# Patient Record
Sex: FEMALE | Race: WHITE | HISPANIC OR LATINO | Employment: UNEMPLOYED | URBAN - METROPOLITAN AREA
[De-identification: names, ages, dates, MRNs, and addresses within clinical notes are randomized per-mention and may not be internally consistent; named-entity substitution may affect disease eponyms.]

---

## 2018-08-01 ENCOUNTER — HOSPITAL ENCOUNTER (INPATIENT)
Facility: MEDICAL CENTER | Age: 58
LOS: 1 days | DRG: 282 | End: 2018-08-02
Attending: EMERGENCY MEDICINE | Admitting: HOSPITALIST
Payer: COMMERCIAL

## 2018-08-01 DIAGNOSIS — I24.9 ACUTE CORONARY SYNDROME (HCC): ICD-10-CM

## 2018-08-01 PROBLEM — I10 HTN (HYPERTENSION): Status: ACTIVE | Noted: 2018-08-01

## 2018-08-01 PROBLEM — R79.9 ELEVATED BUN: Status: ACTIVE | Noted: 2018-08-01

## 2018-08-01 PROBLEM — E11.9 DM (DIABETES MELLITUS) (HCC): Status: ACTIVE | Noted: 2018-08-01

## 2018-08-01 PROBLEM — E78.5 HLD (HYPERLIPIDEMIA): Status: ACTIVE | Noted: 2018-08-01

## 2018-08-01 PROBLEM — I21.4 NSTEMI (NON-ST ELEVATED MYOCARDIAL INFARCTION) (HCC): Status: ACTIVE | Noted: 2018-08-01

## 2018-08-01 PROBLEM — R07.9 PAIN IN THE CHEST: Status: ACTIVE | Noted: 2018-08-01

## 2018-08-01 PROBLEM — E87.6 HYPOKALEMIA: Status: ACTIVE | Noted: 2018-08-01

## 2018-08-01 LAB
ALBUMIN SERPL BCP-MCNC: 4.2 G/DL (ref 3.2–4.9)
ALBUMIN/GLOB SERPL: 1.6 G/DL
ALP SERPL-CCNC: 82 U/L (ref 30–99)
ALT SERPL-CCNC: 46 U/L (ref 2–50)
ANION GAP SERPL CALC-SCNC: 8 MMOL/L (ref 0–11.9)
APTT PPP: 27 SEC (ref 24.7–36)
APTT PPP: 27.1 SEC (ref 24.7–36)
AST SERPL-CCNC: 66 U/L (ref 12–45)
BASOPHILS # BLD AUTO: 0.3 % (ref 0–1.8)
BASOPHILS # BLD: 0.03 K/UL (ref 0–0.12)
BILIRUB SERPL-MCNC: 0.5 MG/DL (ref 0.1–1.5)
BUN SERPL-MCNC: 16 MG/DL (ref 8–22)
CALCIUM SERPL-MCNC: 9 MG/DL (ref 8.5–10.5)
CHLORIDE SERPL-SCNC: 109 MMOL/L (ref 96–112)
CHOLEST SERPL-MCNC: 97 MG/DL (ref 100–199)
CO2 SERPL-SCNC: 22 MMOL/L (ref 20–33)
CREAT SERPL-MCNC: 0.64 MG/DL (ref 0.5–1.4)
EKG IMPRESSION: NORMAL
EKG IMPRESSION: NORMAL
EOSINOPHIL # BLD AUTO: 0.03 K/UL (ref 0–0.51)
EOSINOPHIL NFR BLD: 0.3 % (ref 0–6.9)
ERYTHROCYTE [DISTWIDTH] IN BLOOD BY AUTOMATED COUNT: 43.1 FL (ref 35.9–50)
EST. AVERAGE GLUCOSE BLD GHB EST-MCNC: 128 MG/DL
EST. AVERAGE GLUCOSE BLD GHB EST-MCNC: 128 MG/DL
GLOBULIN SER CALC-MCNC: 2.7 G/DL (ref 1.9–3.5)
GLUCOSE BLD-MCNC: 107 MG/DL (ref 65–99)
GLUCOSE BLD-MCNC: 138 MG/DL (ref 65–99)
GLUCOSE BLD-MCNC: 239 MG/DL (ref 65–99)
GLUCOSE SERPL-MCNC: 229 MG/DL (ref 65–99)
HBA1C MFR BLD: 6.1 % (ref 0–5.6)
HBA1C MFR BLD: 6.1 % (ref 0–5.6)
HCT VFR BLD AUTO: 37.7 % (ref 37–47)
HDLC SERPL-MCNC: 25 MG/DL
HGB BLD-MCNC: 12.6 G/DL (ref 12–16)
IMM GRANULOCYTES # BLD AUTO: 0.05 K/UL (ref 0–0.11)
IMM GRANULOCYTES NFR BLD AUTO: 0.5 % (ref 0–0.9)
LDLC SERPL CALC-MCNC: 30 MG/DL
LYMPHOCYTES # BLD AUTO: 1.59 K/UL (ref 1–4.8)
LYMPHOCYTES NFR BLD: 16.7 % (ref 22–41)
MCH RBC QN AUTO: 30.5 PG (ref 27–33)
MCHC RBC AUTO-ENTMCNC: 33.4 G/DL (ref 33.6–35)
MCV RBC AUTO: 91.3 FL (ref 81.4–97.8)
MONOCYTES # BLD AUTO: 0.48 K/UL (ref 0–0.85)
MONOCYTES NFR BLD AUTO: 5.1 % (ref 0–13.4)
NEUTROPHILS # BLD AUTO: 7.32 K/UL (ref 2–7.15)
NEUTROPHILS NFR BLD: 77.1 % (ref 44–72)
NRBC # BLD AUTO: 0 K/UL
NRBC BLD-RTO: 0 /100 WBC
PLATELET # BLD AUTO: 209 K/UL (ref 164–446)
PMV BLD AUTO: 10.5 FL (ref 9–12.9)
POTASSIUM SERPL-SCNC: 3.8 MMOL/L (ref 3.6–5.5)
PROT SERPL-MCNC: 6.9 G/DL (ref 6–8.2)
RBC # BLD AUTO: 4.13 M/UL (ref 4.2–5.4)
SODIUM SERPL-SCNC: 139 MMOL/L (ref 135–145)
TRIGL SERPL-MCNC: 209 MG/DL (ref 0–149)
TROPONIN I SERPL-MCNC: 1.24 NG/ML (ref 0–0.04)
TROPONIN I SERPL-MCNC: 16.28 NG/ML (ref 0–0.04)
TSH SERPL DL<=0.005 MIU/L-ACNC: 1.3 UIU/ML (ref 0.38–5.33)
WBC # BLD AUTO: 9.5 K/UL (ref 4.8–10.8)

## 2018-08-01 PROCEDURE — 304952 HCHG R 2 PADS

## 2018-08-01 PROCEDURE — 80061 LIPID PANEL: CPT

## 2018-08-01 PROCEDURE — 93005 ELECTROCARDIOGRAM TRACING: CPT | Performed by: HOSPITALIST

## 2018-08-01 PROCEDURE — 96374 THER/PROPH/DIAG INJ IV PUSH: CPT

## 2018-08-01 PROCEDURE — 93005 ELECTROCARDIOGRAM TRACING: CPT | Performed by: EMERGENCY MEDICINE

## 2018-08-01 PROCEDURE — B2151ZZ FLUOROSCOPY OF LEFT HEART USING LOW OSMOLAR CONTRAST: ICD-10-PCS | Performed by: INTERNAL MEDICINE

## 2018-08-01 PROCEDURE — 700117 HCHG RX CONTRAST REV CODE 255: Performed by: INTERNAL MEDICINE

## 2018-08-01 PROCEDURE — 96375 TX/PRO/DX INJ NEW DRUG ADDON: CPT

## 2018-08-01 PROCEDURE — 93010 ELECTROCARDIOGRAM REPORT: CPT | Performed by: INTERNAL MEDICINE

## 2018-08-01 PROCEDURE — 85730 THROMBOPLASTIN TIME PARTIAL: CPT

## 2018-08-01 PROCEDURE — 99285 EMERGENCY DEPT VISIT HI MDM: CPT

## 2018-08-01 PROCEDURE — 83036 HEMOGLOBIN GLYCOSYLATED A1C: CPT

## 2018-08-01 PROCEDURE — 94760 N-INVAS EAR/PLS OXIMETRY 1: CPT

## 2018-08-01 PROCEDURE — 93458 L HRT ARTERY/VENTRICLE ANGIO: CPT

## 2018-08-01 PROCEDURE — 700102 HCHG RX REV CODE 250 W/ 637 OVERRIDE(OP): Performed by: INTERNAL MEDICINE

## 2018-08-01 PROCEDURE — 4A023N7 MEASUREMENT OF CARDIAC SAMPLING AND PRESSURE, LEFT HEART, PERCUTANEOUS APPROACH: ICD-10-PCS | Performed by: INTERNAL MEDICINE

## 2018-08-01 PROCEDURE — 82962 GLUCOSE BLOOD TEST: CPT

## 2018-08-01 PROCEDURE — 700105 HCHG RX REV CODE 258: Performed by: HOSPITALIST

## 2018-08-01 PROCEDURE — 700102 HCHG RX REV CODE 250 W/ 637 OVERRIDE(OP): Performed by: HOSPITALIST

## 2018-08-01 PROCEDURE — 700111 HCHG RX REV CODE 636 W/ 250 OVERRIDE (IP): Performed by: HOSPITALIST

## 2018-08-01 PROCEDURE — C1769 GUIDE WIRE: HCPCS

## 2018-08-01 PROCEDURE — 770020 HCHG ROOM/CARE - TELE (206)

## 2018-08-01 PROCEDURE — C1894 INTRO/SHEATH, NON-LASER: HCPCS

## 2018-08-01 PROCEDURE — 700105 HCHG RX REV CODE 258: Performed by: INTERNAL MEDICINE

## 2018-08-01 PROCEDURE — 36415 COLL VENOUS BLD VENIPUNCTURE: CPT

## 2018-08-01 PROCEDURE — 80053 COMPREHEN METABOLIC PANEL: CPT

## 2018-08-01 PROCEDURE — 84484 ASSAY OF TROPONIN QUANT: CPT

## 2018-08-01 PROCEDURE — 85025 COMPLETE CBC W/AUTO DIFF WBC: CPT

## 2018-08-01 PROCEDURE — 99223 1ST HOSP IP/OBS HIGH 75: CPT | Performed by: HOSPITALIST

## 2018-08-01 PROCEDURE — A9270 NON-COVERED ITEM OR SERVICE: HCPCS | Performed by: HOSPITALIST

## 2018-08-01 PROCEDURE — 93005 ELECTROCARDIOGRAM TRACING: CPT

## 2018-08-01 PROCEDURE — 700111 HCHG RX REV CODE 636 W/ 250 OVERRIDE (IP): Performed by: EMERGENCY MEDICINE

## 2018-08-01 PROCEDURE — 700111 HCHG RX REV CODE 636 W/ 250 OVERRIDE (IP)

## 2018-08-01 PROCEDURE — 76937 US GUIDE VASCULAR ACCESS: CPT

## 2018-08-01 PROCEDURE — 307093 HCHG TR BAND RADIAL

## 2018-08-01 PROCEDURE — C1887 CATHETER, GUIDING: HCPCS

## 2018-08-01 PROCEDURE — B2111ZZ FLUOROSCOPY OF MULTIPLE CORONARY ARTERIES USING LOW OSMOLAR CONTRAST: ICD-10-PCS | Performed by: INTERNAL MEDICINE

## 2018-08-01 PROCEDURE — A9270 NON-COVERED ITEM OR SERVICE: HCPCS | Performed by: INTERNAL MEDICINE

## 2018-08-01 PROCEDURE — 700101 HCHG RX REV CODE 250

## 2018-08-01 PROCEDURE — 84443 ASSAY THYROID STIM HORMONE: CPT

## 2018-08-01 PROCEDURE — 99152 MOD SED SAME PHYS/QHP 5/>YRS: CPT

## 2018-08-01 RX ORDER — SODIUM CHLORIDE 9 MG/ML
INJECTION, SOLUTION INTRAVENOUS CONTINUOUS
Status: DISCONTINUED | OUTPATIENT
Start: 2018-08-01 | End: 2018-08-01

## 2018-08-01 RX ORDER — VERAPAMIL HYDROCHLORIDE 2.5 MG/ML
INJECTION, SOLUTION INTRAVENOUS
Status: COMPLETED
Start: 2018-08-01 | End: 2018-08-01

## 2018-08-01 RX ORDER — AMOXICILLIN 250 MG
2 CAPSULE ORAL 2 TIMES DAILY
Status: DISCONTINUED | OUTPATIENT
Start: 2018-08-01 | End: 2018-08-02 | Stop reason: HOSPADM

## 2018-08-01 RX ORDER — ASPIRIN 300 MG/1
300 SUPPOSITORY RECTAL DAILY
Status: DISCONTINUED | OUTPATIENT
Start: 2018-08-01 | End: 2018-08-01

## 2018-08-01 RX ORDER — ASPIRIN 81 MG/1
324 TABLET, CHEWABLE ORAL DAILY
Status: DISCONTINUED | OUTPATIENT
Start: 2018-08-01 | End: 2018-08-01

## 2018-08-01 RX ORDER — HEPARIN SODIUM,PORCINE 1000/ML
VIAL (ML) INJECTION
Status: COMPLETED
Start: 2018-08-01 | End: 2018-08-01

## 2018-08-01 RX ORDER — ATORVASTATIN CALCIUM 80 MG/1
80 TABLET, FILM COATED ORAL DAILY
Status: DISCONTINUED | OUTPATIENT
Start: 2018-08-01 | End: 2018-08-02 | Stop reason: HOSPADM

## 2018-08-01 RX ORDER — MIDAZOLAM HYDROCHLORIDE 1 MG/ML
INJECTION INTRAMUSCULAR; INTRAVENOUS
Status: COMPLETED
Start: 2018-08-01 | End: 2018-08-01

## 2018-08-01 RX ORDER — HEPARIN SODIUM 5000 [USP'U]/ML
5000 INJECTION, SOLUTION INTRAVENOUS; SUBCUTANEOUS EVERY 8 HOURS
Status: DISCONTINUED | OUTPATIENT
Start: 2018-08-01 | End: 2018-08-01

## 2018-08-01 RX ORDER — OXYCODONE HYDROCHLORIDE 5 MG/1
5 TABLET ORAL
Status: DISCONTINUED | OUTPATIENT
Start: 2018-08-01 | End: 2018-08-02 | Stop reason: HOSPADM

## 2018-08-01 RX ORDER — ASPIRIN 325 MG
325 TABLET ORAL DAILY
Status: DISCONTINUED | OUTPATIENT
Start: 2018-08-01 | End: 2018-08-02 | Stop reason: HOSPADM

## 2018-08-01 RX ORDER — ONDANSETRON 2 MG/ML
4 INJECTION INTRAMUSCULAR; INTRAVENOUS EVERY 4 HOURS PRN
Status: DISCONTINUED | OUTPATIENT
Start: 2018-08-01 | End: 2018-08-02 | Stop reason: HOSPADM

## 2018-08-01 RX ORDER — HEPARIN SODIUM 1000 [USP'U]/ML
5000 INJECTION, SOLUTION INTRAVENOUS; SUBCUTANEOUS ONCE
Status: COMPLETED | OUTPATIENT
Start: 2018-08-01 | End: 2018-08-01

## 2018-08-01 RX ORDER — LIDOCAINE HYDROCHLORIDE 20 MG/ML
INJECTION, SOLUTION INFILTRATION; PERINEURAL
Status: COMPLETED
Start: 2018-08-01 | End: 2018-08-01

## 2018-08-01 RX ORDER — BISACODYL 10 MG
10 SUPPOSITORY, RECTAL RECTAL
Status: DISCONTINUED | OUTPATIENT
Start: 2018-08-01 | End: 2018-08-02 | Stop reason: HOSPADM

## 2018-08-01 RX ORDER — ENALAPRIL MALEATE 2.5 MG/1
2.5 TABLET ORAL TWICE DAILY
Status: DISCONTINUED | OUTPATIENT
Start: 2018-08-01 | End: 2018-08-02 | Stop reason: HOSPADM

## 2018-08-01 RX ORDER — ASPIRIN 325 MG
325 TABLET ORAL DAILY
Status: DISCONTINUED | OUTPATIENT
Start: 2018-08-01 | End: 2018-08-01

## 2018-08-01 RX ORDER — PROMETHAZINE HYDROCHLORIDE 25 MG/1
12.5-25 SUPPOSITORY RECTAL EVERY 4 HOURS PRN
Status: DISCONTINUED | OUTPATIENT
Start: 2018-08-01 | End: 2018-08-02 | Stop reason: HOSPADM

## 2018-08-01 RX ORDER — PROMETHAZINE HYDROCHLORIDE 25 MG/1
12.5-25 TABLET ORAL EVERY 4 HOURS PRN
Status: DISCONTINUED | OUTPATIENT
Start: 2018-08-01 | End: 2018-08-02 | Stop reason: HOSPADM

## 2018-08-01 RX ORDER — POTASSIUM CHLORIDE 20 MEQ/1
40 TABLET, EXTENDED RELEASE ORAL ONCE
Status: COMPLETED | OUTPATIENT
Start: 2018-08-01 | End: 2018-08-01

## 2018-08-01 RX ORDER — MORPHINE SULFATE 4 MG/ML
4 INJECTION, SOLUTION INTRAMUSCULAR; INTRAVENOUS ONCE
Status: COMPLETED | OUTPATIENT
Start: 2018-08-01 | End: 2018-08-01

## 2018-08-01 RX ORDER — OXYCODONE HYDROCHLORIDE 10 MG/1
10 TABLET ORAL
Status: DISCONTINUED | OUTPATIENT
Start: 2018-08-01 | End: 2018-08-02 | Stop reason: HOSPADM

## 2018-08-01 RX ORDER — HEPARIN SODIUM 1000 [USP'U]/ML
2600 INJECTION, SOLUTION INTRAVENOUS; SUBCUTANEOUS PRN
Status: DISCONTINUED | OUTPATIENT
Start: 2018-08-01 | End: 2018-08-02

## 2018-08-01 RX ORDER — DEXTROSE MONOHYDRATE 25 G/50ML
25 INJECTION, SOLUTION INTRAVENOUS
Status: DISCONTINUED | OUTPATIENT
Start: 2018-08-01 | End: 2018-08-02 | Stop reason: HOSPADM

## 2018-08-01 RX ORDER — ONDANSETRON 2 MG/ML
4 INJECTION INTRAMUSCULAR; INTRAVENOUS ONCE
Status: COMPLETED | OUTPATIENT
Start: 2018-08-01 | End: 2018-08-01

## 2018-08-01 RX ORDER — SODIUM CHLORIDE 9 MG/ML
INJECTION, SOLUTION INTRAVENOUS CONTINUOUS
Status: DISCONTINUED | OUTPATIENT
Start: 2018-08-01 | End: 2018-08-02 | Stop reason: HOSPADM

## 2018-08-01 RX ORDER — POLYETHYLENE GLYCOL 3350 17 G/17G
1 POWDER, FOR SOLUTION ORAL
Status: DISCONTINUED | OUTPATIENT
Start: 2018-08-01 | End: 2018-08-02 | Stop reason: HOSPADM

## 2018-08-01 RX ORDER — MORPHINE SULFATE 4 MG/ML
2-4 INJECTION, SOLUTION INTRAMUSCULAR; INTRAVENOUS
Status: DISCONTINUED | OUTPATIENT
Start: 2018-08-01 | End: 2018-08-02 | Stop reason: HOSPADM

## 2018-08-01 RX ORDER — ONDANSETRON 4 MG/1
4 TABLET, ORALLY DISINTEGRATING ORAL EVERY 4 HOURS PRN
Status: DISCONTINUED | OUTPATIENT
Start: 2018-08-01 | End: 2018-08-02 | Stop reason: HOSPADM

## 2018-08-01 RX ADMIN — ASPIRIN 325 MG: 325 TABLET ORAL at 06:14

## 2018-08-01 RX ADMIN — ENALAPRIL MALEATE 2.5 MG: 2.5 TABLET ORAL at 17:45

## 2018-08-01 RX ADMIN — SODIUM CHLORIDE: 9 INJECTION, SOLUTION INTRAVENOUS at 05:30

## 2018-08-01 RX ADMIN — DOCUSATE SODIUM AND SENNOSIDES 2 TABLET: 8.6; 5 TABLET, FILM COATED ORAL at 17:47

## 2018-08-01 RX ADMIN — METOPROLOL TARTRATE 25 MG: 25 TABLET, FILM COATED ORAL at 17:46

## 2018-08-01 RX ADMIN — ATORVASTATIN CALCIUM 80 MG: 80 TABLET, FILM COATED ORAL at 06:14

## 2018-08-01 RX ADMIN — NITROGLYCERIN 10 ML: 20 INJECTION INTRAVENOUS at 07:48

## 2018-08-01 RX ADMIN — HEPARIN SODIUM 2000 UNITS: 200 INJECTION, SOLUTION INTRAVENOUS at 07:48

## 2018-08-01 RX ADMIN — SODIUM CHLORIDE: 9 INJECTION, SOLUTION INTRAVENOUS at 17:49

## 2018-08-01 RX ADMIN — LIDOCAINE HYDROCHLORIDE: 20 INJECTION, SOLUTION INFILTRATION; PERINEURAL at 07:48

## 2018-08-01 RX ADMIN — NITROGLYCERIN 0.5 INCH: 20 OINTMENT TOPICAL at 17:48

## 2018-08-01 RX ADMIN — HEPARIN SODIUM: 1000 INJECTION, SOLUTION INTRAVENOUS; SUBCUTANEOUS at 07:47

## 2018-08-01 RX ADMIN — FENTANYL CITRATE 50 MCG: 50 INJECTION, SOLUTION INTRAMUSCULAR; INTRAVENOUS at 08:36

## 2018-08-01 RX ADMIN — ONDANSETRON HYDROCHLORIDE 4 MG: 2 INJECTION, SOLUTION INTRAMUSCULAR; INTRAVENOUS at 04:06

## 2018-08-01 RX ADMIN — VERAPAMIL HYDROCHLORIDE 5 MG: 2.5 INJECTION, SOLUTION INTRAVENOUS at 07:48

## 2018-08-01 RX ADMIN — HEPARIN SODIUM 25000 UNITS: 5000 INJECTION, SOLUTION INTRAVENOUS at 05:18

## 2018-08-01 RX ADMIN — MIDAZOLAM HYDROCHLORIDE 2 MG: 1 INJECTION, SOLUTION INTRAMUSCULAR; INTRAVENOUS at 08:36

## 2018-08-01 RX ADMIN — MORPHINE SULFATE 4 MG: 4 INJECTION INTRAVENOUS at 04:06

## 2018-08-01 RX ADMIN — NITROGLYCERIN 0.5 INCH: 20 OINTMENT TOPICAL at 13:21

## 2018-08-01 RX ADMIN — HEPARIN SODIUM 5000 UNITS: 1000 INJECTION, SOLUTION INTRAVENOUS; SUBCUTANEOUS at 05:18

## 2018-08-01 RX ADMIN — IOHEXOL 57 ML: 350 INJECTION, SOLUTION INTRAVENOUS at 08:37

## 2018-08-01 RX ADMIN — POTASSIUM CHLORIDE 40 MEQ: 1500 TABLET, EXTENDED RELEASE ORAL at 06:09

## 2018-08-01 RX ADMIN — INSULIN HUMAN 2 UNITS: 100 INJECTION, SOLUTION PARENTERAL at 12:48

## 2018-08-01 ASSESSMENT — PATIENT HEALTH QUESTIONNAIRE - PHQ9
SUM OF ALL RESPONSES TO PHQ9 QUESTIONS 1 AND 2: 1
9. THOUGHTS THAT YOU WOULD BE BETTER OFF DEAD, OR OF HURTING YOURSELF: NOT AT ALL
1. LITTLE INTEREST OR PLEASURE IN DOING THINGS: NOT AT ALL
SUM OF ALL RESPONSES TO PHQ QUESTIONS 1-9: 5
8. MOVING OR SPEAKING SO SLOWLY THAT OTHER PEOPLE COULD HAVE NOTICED. OR THE OPPOSITE, BEING SO FIGETY OR RESTLESS THAT YOU HAVE BEEN MOVING AROUND A LOT MORE THAN USUAL: NOT AT ALL
2. FEELING DOWN, DEPRESSED, IRRITABLE, OR HOPELESS: SEVERAL DAYS
4. FEELING TIRED OR HAVING LITTLE ENERGY: SEVERAL DAYS
6. FEELING BAD ABOUT YOURSELF - OR THAT YOU ARE A FAILURE OR HAVE LET YOURSELF OR YOUR FAMILY DOWN: NOT AL ALL
5. POOR APPETITE OR OVEREATING: NOT AT ALL
7. TROUBLE CONCENTRATING ON THINGS, SUCH AS READING THE NEWSPAPER OR WATCHING TELEVISION: NOT AT ALL
3. TROUBLE FALLING OR STAYING ASLEEP OR SLEEPING TOO MUCH: NEARLY EVERY DAY

## 2018-08-01 ASSESSMENT — COGNITIVE AND FUNCTIONAL STATUS - GENERAL
DAILY ACTIVITIY SCORE: 24
MOVING TO AND FROM BED TO CHAIR: A LITTLE
SUGGESTED CMS G CODE MODIFIER MOBILITY: CJ
SUGGESTED CMS G CODE MODIFIER DAILY ACTIVITY: CH
WALKING IN HOSPITAL ROOM: A LITTLE
MOBILITY SCORE: 22
SUGGESTED CMS G CODE MODIFIER MOBILITY: CH
MOBILITY SCORE: 24

## 2018-08-01 ASSESSMENT — LIFESTYLE VARIABLES
DO YOU DRINK ALCOHOL: NO
SUBSTANCE_ABUSE: 0

## 2018-08-01 ASSESSMENT — ENCOUNTER SYMPTOMS
FOCAL WEAKNESS: 0
PALPITATIONS: 0
BLURRED VISION: 0
HEARTBURN: 0
DEPRESSION: 0
DOUBLE VISION: 0
EYE DISCHARGE: 0
DIZZINESS: 0
SHORTNESS OF BREATH: 1
WEAKNESS: 0
FEVER: 0
ABDOMINAL PAIN: 0
FLANK PAIN: 0
NAUSEA: 1
HEMOPTYSIS: 0
VOMITING: 0
MYALGIAS: 0
DIAPHORESIS: 1
CHILLS: 0
SENSORY CHANGE: 0
COUGH: 0
BRUISES/BLEEDS EASILY: 0
HALLUCINATIONS: 0
SPEECH CHANGE: 0

## 2018-08-01 ASSESSMENT — PAIN SCALES - GENERAL
PAINLEVEL_OUTOF10: 3
PAINLEVEL_OUTOF10: 2
PAINLEVEL_OUTOF10: 0

## 2018-08-01 NOTE — CONSULTS
DATE OF SERVICE:  08/01/2018    CARDIOLOGY CONSULTATION    REQUESTING PHYSICIAN:  Dr. Bravo.    REASON FOR CONSULTATION:  Chest discomfort with elevated troponin.    HISTORY OF PRESENT ILLNESS:  The patient is a 58-year-old  female with   history of diabetes mellitus, hypertension, and dyslipidemia, who woke up   tonight with severe chest discomfort associated with shortness of breath and   diaphoresis.  The pain was quite severe localized in the mid anterior chest.    She described it as a pressure and was quite persistent.  She initially sought   medical attention at [facility].  She was given 3 sublingual   nitroglycerins, but the pain did not improve until she was subsequently given   IV medication, probably nitroglycerin.  She denies any unusual strenuous activity   or noncompliance with her medications.    The patient stated that she has had chest pain off and on over the year. They   occur randomly. The most recent one was a couple of months ago.  Her blood   pressure also had been somewhat difficult to control.  Her physician changed   her medication a couple of months ago before she travel to this country from   [place].  Over the last couple of months, she has been taking enalapril twice a   day. She believes it has controlled her blood pressure reasonably well.    She denied any recent unusual strenuous activity.  She was pain free on her   arrival to our facility, but later on reports some recurrence of chest pain of   mild degree.    ALLERGIES:  She has no known drug allergy.    HOME MEDICATIONS:  Include enalapril 10 mg twice a day and fenofibrate and   aspirin.    PAST MEDICAL HISTORY:  As mentioned above, remarkable for history of   hypertension for about 12 years, diabetes mellitus for 3-4 years and   dyslipidemia and passing a kidney stone.    PAST SURGICAL HISTORY:  Positive for hysterectomy.    FAMILY HISTORY:  Father has myocardial infarction, probably in his early part   of his  life.  He passed away about 72 years of age, but probably not from   heart disease.    SOCIAL HISTORY:  Never smoked, denied alcohol or drug use.    REVIEW OF SYSTEMS:  Again denied fever, chill, orthopnea, paroxysmal nocturnal   dyspnea, palpitation, dizziness, syncope, abdominal pain, hematemesis, focal   weakness, numbness.  Positive for chest discomfort associated with shortness   of breath and nausea and diaphoresis as mentioned above.  All other systems   are negative.    PHYSICAL EXAMINATION:  GENERAL:  Reveals 58-year-old  female, not in acute distress, alert   and oriented x3.  VITAL SIGNS:  Blood pressure 114/70, heart rate 62, respiratory rate 18.  HEENT:  Head is atraumatic, normocephalic.  Pupils are equal, round and   reactive to light.  Extraocular eye movement intact.  NECK:  Supple.  No JVD.  No carotid bruits.  No thyromegaly or   lymphadenopathy.  LUNGS:  Good expansion.  No rales or wheezing.  HEART:  Regular rate and rhythm.  PMI fifth intercostal space, midclavicular   line.  No heaving.  No thrill.  Normal S1, S2.  No murmur, rub or gallop.  ABDOMEN:  Soft.  No mass, no bruits.  EXTREMITIES:  No clubbing, cyanosis or edema.  SKIN:  No ecchymosis or petechiae.  NEUROLOGIC:  No focal weakness or numbness.  Cranial nerves intact.  PSYCHIATRIC:  Normal mood, normal affect.    LABORATORY DATA:  Electrocardiogram by my review shows sinus rhythm with early   transition and slight J-point elevation in lead V2, and ST depression in the   inferior lead.  Troponin I was 1.24.  CMP from [facility]; sodium 137, potassium   3.3, BUN 27, creatinine 0.7.  Albumin 4.5.  ALT 55, AST 40.  Hemoglobin 13,   platelets 214, WBC 8.1.    IMPRESSION:  1.  Acute coronary syndrome.  Patient has severe chest discomfort with rising   troponin.  Electrocardiogram showed inferior ST depression and slight J-point   elevation in the lead V2, there is no definite ST elevation.  She became pain   free after IV  nitroglycerin.  She has multiple risk factors for coronary   artery disease.  I believe that she should be placed on IV heparin, aspirin,   beta-blocker and should be arranged for coronary angiography later today.    Risks and benefits of procedure were discussed with her and her daughter at   length.  They understood, accepted risks and wished to proceed.  2.  History of hypertension.  Blood pressure appeared to be reasonably   controlled on Enalapril.  3.  Hypokalemia, need to be replaced.  4.  History of dyslipidemia, on Fenofibrate.  We will start her on statin.  5.  History of diabetes mellitus.    PLAN:  Continue serial troponin.  Agree with IV heparin bolus protocol,  statin, aspirin, potassium replacement and metoprolol.  We will arrange for   cardiac catheterization later today.  I will follow the patient along with   you.  I would continue enalapril, but at a lower dose with addition of   metoprolol.    Thank you for allowing us to participate in the care of this patient.       ____________________________________     MD RYLEE MARS / JAMILA    DD:  08/01/2018 04:50:22  DT:  08/01/2018 05:27:25    D#:  [id number]  Job#:  [id number]

## 2018-08-01 NOTE — ED NOTES
Pt taken to Tele on [facility] by ER RN. Chart and belongings with patient. No acute signs distress present. Pt placed on monitor and Tele RN called to bedside.

## 2018-08-01 NOTE — H&P
Hospital Medicine History & Physical Note    Date of Service  8/1/2018    Primary Care Physician  No primary care provider on file.    Consultants  none    Code Status  full    Chief Complaint  Chest pain    History of Presenting Illness  58 y.o. female who presented 8/1/2018 with chest pain.  Patient presents with chest pain that she woke up from sleep.  Substernal pressure dyspnea nausea diaphoresis associated.  Also radiation to left arm.  She does have history of hypertension dyslipidemia diabetes.  She received aspirin and nitro outside hospital she continues to have mild discomfort.  But does feel significantly better.  She has no alleviating or exacerbating factors to her symptoms.    Review of Systems  Review of Systems   Constitutional: Positive for diaphoresis. Negative for chills and fever.   HENT: Negative for congestion, hearing loss and tinnitus.    Eyes: Negative for blurred vision, double vision and discharge.   Respiratory: Positive for shortness of breath. Negative for cough and hemoptysis.    Cardiovascular: Positive for chest pain. Negative for palpitations and leg swelling.   Gastrointestinal: Positive for nausea. Negative for abdominal pain, heartburn and vomiting.   Genitourinary: Negative for dysuria and flank pain.   Musculoskeletal: Negative for joint pain and myalgias.   Skin: Negative for rash.   Neurological: Negative for dizziness, sensory change, speech change, focal weakness and weakness.   Endo/Heme/Allergies: Negative for environmental allergies. Does not bruise/bleed easily.   Psychiatric/Behavioral: Negative for depression, hallucinations and substance abuse.       Past Medical History   has a past medical history of CKD (chronic kidney disease); Diabetes (HCC); Dyslipidemia; and Hypertension.    Surgical History  Reviewed and noncontributory    Family History  Reviewed and noncontributory    Social History   reports that she has never smoked. She has never used smokeless tobacco.  She reports that she does not drink alcohol or use drugs.    Allergies  No Known Allergies    Medications  None       Physical Exam      Temperature: 36.8 °C (98.3 °F)   Pulse: 72   Respiration: 20   Pulse Oximetry: 99 %     Physical Exam    Laboratory:    WBC 8.1 hemoglobin 13.2 platelet 214 sodium 137 potassium 3.3 chloride 106 CO2 24 calcium 9.2 BUN 27 creatinine 0.77 troponin 0.01 INR 1 d-dimer less than 135      No results for input(s): ALTSGPT, ASTSGOT, ALKPHOSPHAT, TBILIRUBIN, DBILIRUBIN, GAMMAGT, AMYLASE, LIPASE, ALB, PREALBUMIN, GLUCOSE in the last 72 hours.              No results found for: TROPONINI    Urinalysis:    No results found     Imaging:  No orders to display   CXR with no acute cardiopulmonary process      Assessment/Plan:  I anticipate this patient is appropriate for observation status at this time.    * NSTEMI (non-ST elevated myocardial infarction) ([facility])   Assessment & Plan    Trop bump 1.2  Cont to trend trops   Cardiology ocnsulted, EKG with nonspecific changes   Heparin ggt ordered  BB  ASA, statin   Nitropaste for chest pain          Hypokalemia   Assessment & Plan    Replace and repeat        Elevated BUN   Assessment & Plan    Monitor, will give low dose IVF     Repeat labs        HLD (hyperlipidemia)   Assessment & Plan    Resume statin        DM (diabetes mellitus) ([facility])   Assessment & Plan    Check a1c  SSI ordered        HTN (hypertension)   Assessment & Plan    Cont bb for now monitor BP             VTE prophylaxis: heparin

## 2018-08-01 NOTE — PROGRESS NOTES
Pt back from procedure resting in bed. Verified on tele box monitor at SR at 63. No change from pervious documentation. TR band in place.

## 2018-08-01 NOTE — ED NOTES
Lab called with critical result of Troponin of 1.24 at 0415. Critical lab result read back to Lab.   Dr. Owen notified of critical lab result at 0415.  Critical lab result read back by Dr. Owen.

## 2018-08-01 NOTE — ED TRIAGE NOTES
"Sydni Su  58 y.o. Female    Chief Complaint   Patient presents with   • Sent by MD     Sent from [facility]   • Chest Pain     Pt was awoken by severe chest pain 10/10 at 22:00. Pt also had SOB, sweating, left arm pain and N/V associated with chest pain.    • Shortness of Breath   • N/V       Pt BIB EMS for above CC. Pt was given 1.2mg Nitro, 324mg ASA and 50mcg fentanyl.   Pt is alert and oriented, speaking in full sentences, follows commands and responds appropriately to questions. NAD. Resp are joanna and unlabored. Pt 2/10 chest pressure. Denies SOB, N/V, arm pain, diaphoresis.   EKG protocol ordered. Labs drawn and tubed to lab. ERP notified transfer has arrived.     Hx: HTN, DM, hyperlipidemia, CKD    NIBP: 119/73, NIBP MAP (Calculated): 96, Heart Rate (Monitored): 72, Pulse: 72, Respiration: 20, Temperature: 36.8 °C (98.3 °F), Height: 160 cm (5' 3\"), Weight: 75 kg (165 lb 5.5 oz), BMI (Calculated): 29.29, BSA (Calculated): 1.8, Pulse Oximetry: 99 %, O2 (LPM): 2, O2 Delivery: Nasal Cannula    "

## 2018-08-01 NOTE — PROGRESS NOTES
Patient arrived from ED accompanied by daughters who translate for patient. Patient denies CP at this time. Discussed NPO status and patient verbalizes understanding that she should not get out of bed without assistance. Call light in reach.

## 2018-08-01 NOTE — PROCEDURES
REFERRING PHYSICIAN: Dr. Zuniga    PREOPERATIVE DIAGNOSIS:  1.  Non-ST elevation myocardial infarction  2.  Hypertension  3.  Recent diagnosis of diabetes mellitus    POSTOPERATIVE DIAGNOSIS:  1.  Focal occlusion of a less than 0.5 mm vessel distal OM likely due to coronary embolism  2.  Mild nonobstructive epicardial CAD otherwise.  3.  LVEF 65%      PROCEDURE PERFORMED:  Selective coronary angiography of the native vessels  Left heart catheterization  Left ventriculogram      DESCRIPTION OF PROCEDURE:  The risks and benefits of cardiac catheterization as well as the procedure itself, rationale and appropriateness were discussed with the patient today. Complications including but not limited to death, stroke, MI, urgent bypass surgery, contrast nephropathy, vascular complications, bleeding and infection were explained to the patient. The potential outcomes associated with the procedure (possible PCI, possible CABG, possible medical Rx only) were also discussed at length. The patient agreed to proceed.    The patient was transported to the catheterization laboratory in the fasting state. The right radial area and right groin were prepped and draped in the usual fashion. Right radial area was entered with a single through and through puncture under direct ultrasound guidance and a 6F glide sheath was placed. An intra-arterial cocktail of heparin, verapamil and nitroglycerine was administered. Over a wire, a 5F Ana catheter was passed to the aortic root and used to engage the right and left coronaries without difficulty. Contrast was administered and multiple images obtained. This catheter was then used to cross the aortic valve for LHC and contrast was administered at 8cc/s for 24cc's for left ventriculogram. All catheters and guidewires were removed and a TR band was applied to achieve patent hemostasis. Patient left the cath lab in stable condition.      Moderate sedation directly monitored by me during  the case while supervising the administration of the sedation medication by an independent trained RN to assist in the monitoring of the patient's level of consciousness and physiological status. I, the supervising physician was present the entire time from beginning of medication administration until the end of the procedure from 8:20 AM until 8:37 AM. For detailed administration records please see the moderate sedation documentation in the median tab.      FINDINGS:  I. HEMODYNAMICS:    Ao: 105/80 mmHg   LEDP: 16 mmHg   Gradient on LV pullback: No    II. LEFT VENTRICULOGRAM:   LVEF REYES PROJECTION: 65 %     III. CORONARY ANGIOGRAPHY:  Left Main: Large mild nonobstructive CAD.  Left Anterior Descending: Large wraps around the apex tortuous with the mid vessel bridging.  Mild nonobstructive epicardial CAD.  Left Circumflex: Moderate size nondominant continuing is a small distal vessel in the AV groove.  The OM1 becomes quite tortuous and bifurcates.  The smaller bifurcation at its distal aspect and less than 0.5 mm vessel has an abrupt occlusion of flow likely due to an embolic issue.  There are no upstream lesions appearing compatible with a culprit source of coronary embolism.  Right Coronary: Large dominant with a focal 30% proximal plaque and otherwise no significant CAD.    COMPLICATIONS: none apparent    CONCLUSIONS:  1.  Focal occlusion of a less than 0.5 mm vessel distal OM likely due to coronary embolism  2.  Mild nonobstructive epicardial CAD otherwise.  3.  LVEF 65%    RECOMMENDATIONS:  The area of embolic occlusion is too small for percutaneous intervention but may be responsible for the small non-STEMI.  Recommend medical therapy and a search for a possible cause for coronary embolism.  Alternatively a very small vessel plaque rupture could have presented in the same way.

## 2018-08-01 NOTE — ED NOTES
Pt medicated (see MAR).   Weight obtained by standing scale.   Report given to Tele RNJennifer bedside.   Admitting MD and cardiology bedside, Tele RN back upstairs to floor while MD's visit with patient.

## 2018-08-01 NOTE — ASSESSMENT & PLAN NOTE
Trop bump 1.2  Cont to trend trops   Cardiology ocnsulted, EKG with nonspecific changes   Heparin ggt ordered  BB  ASA, statin   Nitropaste for chest pain

## 2018-08-01 NOTE — PROGRESS NOTES
Pt resting in bed, verbalized being in no pain. Safety precautions in place (bed in lowest position/ non slip socks on/ call light in reach- re educated on use of call light). Family at bed side.

## 2018-08-01 NOTE — ED PROVIDER NOTES
"ED Provider Note    CHIEF COMPLAINT  Chief Complaint   Patient presents with   • Sent by MD     Sent from [facility]   • Chest Pain     Pt was awoken by severe chest pain 10/10 at 22:00. Pt also had SOB, sweating, left arm pain and N/V associated with chest pain.    • Shortness of Breath   • N/V       HPI  Sydni Su is a 58 y.o. female who presents with chest pain. The patient states the chest pain awoke her from her sleep this evening. She states this substernal location and described as pressure. She did have associated dyspnea, nausea, and diaphoresis. The patient states that she did have some radiation down her left arm. The patient states she does have significant risk factors for cardiac standpoint including hypertension, dyslipidemia, and diabetes mellitus. The patient was evaluated at [facility] and she received aspirin and nitroglycerin with significant decrease in her discomfort. She does continue to have some mild discomfort in the substernal region. But she does feel significantly better. The patient does not have any pain or swelling to her lower extremities. She was unaware of any exacerbating or relieving factors.    REVIEW OF SYSTEMS  See [facility] for further details. All other systems are negative.     PAST MEDICAL HISTORY  No past medical history on file.    SOCIAL HISTORY  Social History     Social History   • Marital status: N/A     Spouse name: N/A   • Number of children: N/A   • Years of education: N/A     Social History Main Topics   • Smoking status: Not on file   • Smokeless tobacco: Not on file   • Alcohol use Not on file   • Drug use: Unknown   • Sexual activity: Not on file     Other Topics Concern   • Not on file     Social History Narrative   • No narrative on file           PHYSICAL EXAM  VITAL SIGNS: Ht 1.6 m (5' 3\")   Wt 75 kg (165 lb 5.5 oz)   BMI 29.29 kg/m²   Constitutional: Mild acute distress, Non-toxic appearance.   HENT: Normocephalic, Atraumatic, tympanic " membranes are intact and nonerythematous bilaterally, Oropharynx moist without exudates or erythema, Nose normal.   Eyes: PERRLA, EOMI, Conjunctiva normal.  Neck: Supple without meningismus  Lymphatic: No lymphadenopathy noted.   Cardiovascular: Normal heart rate, Normal rhythm, No murmurs, No rubs, No gallops.   Thorax & Lungs: Normal breath sounds, No respiratory distress, No wheezing, No chest tenderness.   Abdomen: Bowel sounds normal, Soft, No tenderness, no rebound, no guarding, no distention, No masses, No pulsatile masses.   Skin: Warm, Dry, No erythema, No rash.   Back: No tenderness, No CVA tenderness.   Extremities: Atraumatic with symmetric distal pulses, No edema, No tenderness, No cyanosis, No clubbing.   Neurologic: Alert & oriented x 3, cranial nerves II through XII are intact, Normal motor function, Normal sensory function, No focal deficits noted.   Psychiatric: Affect normal, Judgment normal, Mood normal.     EKG  12-lead EKG interpreted by myself shows a normal sinus rhythm with a ventricular rate of 75, left ventricular hypertrophy, ST segment elevation in lead V2 only, T-wave inversion in V1.    COURSE & MEDICAL DECISION MAKING  Pertinent Labs & Imaging studies reviewed. (See chart for details)  This a 58-year-old female who presents as a transfer for possible unstable angina. Her EKG on arrival did show a box of elevation only in one lead. She had mild discomfort on initial exam. Her troponin did come back elevated at 1.24 and on repeat exam she states her pain is coming back. Therefore she'll receive 4 milligrams of morphine and cardiology will be consulted for suspected acute coronary syndrome. The patient will receive heparin and this is been ordered by myself. The patient be admitted in guarded condition.    FINAL IMPRESSION  1. Acute coronary syndrome  2. Critical care time 30 minutes      Disposition  The patient will be admitted in guarded condition    Electronically signed by: Sanju MILLER  Lawrence, 8/1/2018 3:14 AM

## 2018-08-01 NOTE — CARE PLAN
Problem: Communication  Goal: The ability to communicate needs accurately and effectively will improve  Outcome: PROGRESSING AS EXPECTED  Patient is Yakut-speaking only but communicates needs effectively.    Problem: Safety  Goal: Will remain free from falls  Outcome: PROGRESSING AS EXPECTED  Patient verbalizes understanding not to get out of bed without assistance. Call light in reach.

## 2018-08-01 NOTE — DISCHARGE PLANNING
Spoke w/ pt and her daughter at the bedside. Daughter states that the pt recently moved here from [place] and does not have insurance or a PCP. Pt is also living in [place], but they state they can get her to [place] for follow up appointments. Will defer this information to SW and bedside RN for discharge planning (as far as any prescriptions and follow up appointments)

## 2018-08-01 NOTE — PROGRESS NOTES
Patient was seen and examined by my colleague after midnight. Please refer to the H&P done by Dr. Owen on 8/1/2018 for more details.    I personally saw and examined the patient today. She has returned from the cath lab, no intervention. Focal occlusion of distal OM, coronary embolism vs small vessel plaque rupture. Will continue with medical management.

## 2018-08-02 ENCOUNTER — PATIENT OUTREACH (OUTPATIENT)
Dept: HEALTH INFORMATION MANAGEMENT | Facility: OTHER | Age: 58
End: 2018-08-02

## 2018-08-02 VITALS
TEMPERATURE: 97.5 F | BODY MASS INDEX: 29.45 KG/M2 | SYSTOLIC BLOOD PRESSURE: 119 MMHG | DIASTOLIC BLOOD PRESSURE: 81 MMHG | WEIGHT: 166.23 LBS | HEIGHT: 63 IN | HEART RATE: 67 BPM | OXYGEN SATURATION: 93 % | RESPIRATION RATE: 17 BRPM

## 2018-08-02 PROBLEM — E87.6 HYPOKALEMIA: Status: RESOLVED | Noted: 2018-08-01 | Resolved: 2018-08-02

## 2018-08-02 PROBLEM — R79.9 ELEVATED BUN: Status: RESOLVED | Noted: 2018-08-01 | Resolved: 2018-08-02

## 2018-08-02 LAB
ALBUMIN SERPL BCP-MCNC: 4 G/DL (ref 3.2–4.9)
ALBUMIN/GLOB SERPL: 1.5 G/DL
ALP SERPL-CCNC: 75 U/L (ref 30–99)
ALT SERPL-CCNC: 44 U/L (ref 2–50)
ANION GAP SERPL CALC-SCNC: 8 MMOL/L (ref 0–11.9)
AST SERPL-CCNC: 62 U/L (ref 12–45)
BILIRUB SERPL-MCNC: 0.7 MG/DL (ref 0.1–1.5)
BUN SERPL-MCNC: 15 MG/DL (ref 8–22)
CALCIUM SERPL-MCNC: 8.9 MG/DL (ref 8.5–10.5)
CHLORIDE SERPL-SCNC: 108 MMOL/L (ref 96–112)
CO2 SERPL-SCNC: 23 MMOL/L (ref 20–33)
CREAT SERPL-MCNC: 0.67 MG/DL (ref 0.5–1.4)
DEPRECATED D DIMER PPP IA-ACNC: <200 NG/ML(D-DU)
ERYTHROCYTE [DISTWIDTH] IN BLOOD BY AUTOMATED COUNT: 44 FL (ref 35.9–50)
GLOBULIN SER CALC-MCNC: 2.6 G/DL (ref 1.9–3.5)
GLUCOSE BLD-MCNC: 104 MG/DL (ref 65–99)
GLUCOSE BLD-MCNC: 93 MG/DL (ref 65–99)
GLUCOSE BLD-MCNC: 99 MG/DL (ref 65–99)
GLUCOSE SERPL-MCNC: 108 MG/DL (ref 65–99)
HCT VFR BLD AUTO: 38 % (ref 37–47)
HGB BLD-MCNC: 12.3 G/DL (ref 12–16)
LV EJECT FRACT  99904: 70
LV EJECT FRACT MOD 2C 99903: 68.23
LV EJECT FRACT MOD 4C 99902: 82.25
LV EJECT FRACT MOD BP 99901: 74.85
MCH RBC QN AUTO: 29.9 PG (ref 27–33)
MCHC RBC AUTO-ENTMCNC: 32.4 G/DL (ref 33.6–35)
MCV RBC AUTO: 92.5 FL (ref 81.4–97.8)
PLATELET # BLD AUTO: 199 K/UL (ref 164–446)
PMV BLD AUTO: 10.3 FL (ref 9–12.9)
POTASSIUM SERPL-SCNC: 4 MMOL/L (ref 3.6–5.5)
PROT SERPL-MCNC: 6.6 G/DL (ref 6–8.2)
RBC # BLD AUTO: 4.11 M/UL (ref 4.2–5.4)
SODIUM SERPL-SCNC: 139 MMOL/L (ref 135–145)
WBC # BLD AUTO: 8 K/UL (ref 4.8–10.8)

## 2018-08-02 PROCEDURE — 700102 HCHG RX REV CODE 250 W/ 637 OVERRIDE(OP): Performed by: HOSPITALIST

## 2018-08-02 PROCEDURE — 700105 HCHG RX REV CODE 258: Performed by: INTERNAL MEDICINE

## 2018-08-02 PROCEDURE — 85379 FIBRIN DEGRADATION QUANT: CPT

## 2018-08-02 PROCEDURE — 99239 HOSP IP/OBS DSCHRG MGMT >30: CPT | Performed by: HOSPITALIST

## 2018-08-02 PROCEDURE — 93306 TTE W/DOPPLER COMPLETE: CPT

## 2018-08-02 PROCEDURE — A9270 NON-COVERED ITEM OR SERVICE: HCPCS | Performed by: HOSPITALIST

## 2018-08-02 PROCEDURE — 36415 COLL VENOUS BLD VENIPUNCTURE: CPT

## 2018-08-02 PROCEDURE — 85027 COMPLETE CBC AUTOMATED: CPT

## 2018-08-02 PROCEDURE — A9270 NON-COVERED ITEM OR SERVICE: HCPCS | Performed by: NURSE PRACTITIONER

## 2018-08-02 PROCEDURE — 700102 HCHG RX REV CODE 250 W/ 637 OVERRIDE(OP): Performed by: NURSE PRACTITIONER

## 2018-08-02 PROCEDURE — 93306 TTE W/DOPPLER COMPLETE: CPT | Mod: 26 | Performed by: INTERNAL MEDICINE

## 2018-08-02 PROCEDURE — 80053 COMPREHEN METABOLIC PANEL: CPT

## 2018-08-02 PROCEDURE — 82962 GLUCOSE BLOOD TEST: CPT | Mod: 91

## 2018-08-02 RX ORDER — ATORVASTATIN CALCIUM 20 MG/1
20 TABLET, FILM COATED ORAL DAILY
Status: ON HOLD | COMMUNITY
End: 2018-08-02

## 2018-08-02 RX ORDER — ACETAMINOPHEN 325 MG/1
650 TABLET ORAL EVERY 4 HOURS PRN
Status: DISCONTINUED | OUTPATIENT
Start: 2018-08-02 | End: 2018-08-02 | Stop reason: HOSPADM

## 2018-08-02 RX ORDER — FENOFIBRATE 145 MG/1
160 TABLET, COATED ORAL
Status: ON HOLD | COMMUNITY
End: 2018-08-02

## 2018-08-02 RX ORDER — ATORVASTATIN CALCIUM 80 MG/1
80 TABLET, FILM COATED ORAL DAILY
Qty: 30 TAB | Refills: 0 | Status: SHIPPED | OUTPATIENT
Start: 2018-08-03

## 2018-08-02 RX ORDER — ENALAPRIL MALEATE 10 MG/1
10 TABLET ORAL 2 TIMES DAILY
Status: ON HOLD | COMMUNITY
End: 2018-08-02

## 2018-08-02 RX ORDER — ASPIRIN 81 MG/1
81 TABLET ORAL DAILY
Qty: 30 TAB | Refills: 0 | Status: SHIPPED | OUTPATIENT
Start: 2018-08-02

## 2018-08-02 RX ORDER — ISOSORBIDE MONONITRATE 30 MG/1
30 TABLET, EXTENDED RELEASE ORAL DAILY
Qty: 30 TAB | Refills: 0 | Status: SHIPPED | OUTPATIENT
Start: 2018-08-03

## 2018-08-02 RX ORDER — ISOSORBIDE MONONITRATE 30 MG/1
30 TABLET, EXTENDED RELEASE ORAL
Status: DISCONTINUED | OUTPATIENT
Start: 2018-08-02 | End: 2018-08-02 | Stop reason: HOSPADM

## 2018-08-02 RX ORDER — ENALAPRIL MALEATE 2.5 MG/1
2.5 TABLET ORAL 2 TIMES DAILY
Qty: 60 TAB | Refills: 0 | Status: SHIPPED | OUTPATIENT
Start: 2018-08-02

## 2018-08-02 RX ADMIN — ATORVASTATIN CALCIUM 80 MG: 80 TABLET, FILM COATED ORAL at 06:01

## 2018-08-02 RX ADMIN — NITROGLYCERIN 0.5 INCH: 20 OINTMENT TOPICAL at 06:00

## 2018-08-02 RX ADMIN — ISOSORBIDE MONONITRATE 30 MG: 30 TABLET ORAL at 12:01

## 2018-08-02 RX ADMIN — NITROGLYCERIN 0.5 INCH: 20 OINTMENT TOPICAL at 00:00

## 2018-08-02 RX ADMIN — ACETAMINOPHEN 650 MG: 325 TABLET, FILM COATED ORAL at 16:03

## 2018-08-02 RX ADMIN — SODIUM CHLORIDE: 9 INJECTION, SOLUTION INTRAVENOUS at 02:49

## 2018-08-02 RX ADMIN — ASPIRIN 325 MG: 325 TABLET ORAL at 06:02

## 2018-08-02 RX ADMIN — DOCUSATE SODIUM AND SENNOSIDES 2 TABLET: 8.6; 5 TABLET, FILM COATED ORAL at 06:02

## 2018-08-02 RX ADMIN — METOPROLOL TARTRATE 25 MG: 25 TABLET, FILM COATED ORAL at 06:01

## 2018-08-02 ASSESSMENT — ENCOUNTER SYMPTOMS
PALPITATIONS: 0
CLAUDICATION: 0
PND: 0
NAUSEA: 0
ORTHOPNEA: 0
DIZZINESS: 0
SPUTUM PRODUCTION: 0
WEAKNESS: 0
VOMITING: 0
HEMOPTYSIS: 0
SHORTNESS OF BREATH: 1
WHEEZING: 0
COUGH: 0

## 2018-08-02 ASSESSMENT — PAIN SCALES - GENERAL
PAINLEVEL_OUTOF10: 0
PAINLEVEL_OUTOF10: 0
PAINLEVEL_OUTOF10: 5
PAINLEVEL_OUTOF10: 2
PAINLEVEL_OUTOF10: 0
PAINLEVEL_OUTOF10: 0

## 2018-08-02 NOTE — PROGRESS NOTES
Pt denies any dizziness.  Denies any pain, denies n/v. Discussed POC. Verbalized understanding. Family at bedside.  Active in care. Fall precautions in place.  Call light w/in reach.  Will continue to monitor.

## 2018-08-02 NOTE — DISCHARGE SUMMARY
Discharge Summary    CHIEF COMPLAINT ON ADMISSION  Chief Complaint   Patient presents with   • Sent by MD     Sent from [facility]   • Chest Pain     Pt was awoken by severe chest pain 10/10 at 22:00. Pt also had SOB, sweating, left arm pain and N/V associated with chest pain.    • Shortness of Breath   • N/V       Reason for Admission  EMS     Admission Date  8/1/2018    CODE STATUS  Full Code    HPI & HOSPITAL COURSE  This is a 58 y.o. female here with chest pain. It woke her from sleep the day of presentation. It was substernal pressure associated with dyspnea, nausea, diaphoresis, and radiation to the left arm. She has known diet controlled DM, HTN, HLD. He had ASA and nitro prior to arrival with mild improvement in her symptoms. EKG had nonspecific changes but troponin was elevated to 1.2. She was started on a heparin drip and cardiology was consulted. She was taken to the cath lab but no significant ischemic heart disease was noted. There was a focal occlusion of the distal OM suspected to be 2/2 coronary embolism vs small vessel plaque rupture. She is to be medically managed and will have cardiology follow up. Medications included atorvastatin increased to 80mg, ASA 81, imdur, and metop. Her metoprolol was decreased as she was noted to be bradycardic to the 40's while sleeping. Telemetry monitor didn't show any other arrhythmias.     Her chest pain resolved and she continued to improve. She tolerated the medications well. Did have some slight dizziness but improved with fluids and suspect the initial higher dose of metoprolol may have been contributing. Her dizziness and headache resolved. She was able to ambulate without issue.       Therefore, she is discharged in good and stable condition to home with close outpatient follow-up.    The patient recovered much more quickly than anticipated on admission.    Discharge Date  8/2/2018    FOLLOW UP ITEMS POST DISCHARGE  Follow up with PCP regarding medication  regimen.  A1C 6.1%, will need continued counseling on diet and exercise.    DISCHARGE DIAGNOSES  Principal Problem:    NSTEMI (non-ST elevated myocardial infarction) ([facility]) POA: Yes  Active Problems:    HTN (hypertension) POA: Yes    DM (diabetes mellitus) (HCC) POA: Yes    HLD (hyperlipidemia) POA: Yes  Resolved Problems:    Elevated BUN POA: Yes    Hypokalemia POA: Yes      FOLLOW UP  [address] [address]  [phone]  Schedule an appointment as soon as possible for a visit in 1 week  Follow up appointment      MEDICATIONS ON DISCHARGE     Medication List      START taking these medications      Instructions   aspirin 81 MG EC tablet   Take 1 Tab by mouth every day.  Dose:  81 mg     isosorbide mononitrate SR 30 MG Tb24  Commonly known as:  IMDUR   Take 1 Tab by mouth every day.  Dose:  30 mg     metoprolol 25 MG Tabs  Commonly known as:  LOPRESSOR   Take 0.5 Tabs by mouth 2 Times a Day.  Dose:  12.5 mg        CHANGE how you take these medications      Instructions   atorvastatin 80 MG tablet  What changed:  · medication strength  · how much to take  Commonly known as:  LIPITOR   Take 1 Tab by mouth every day.  Dose:  80 mg     enalapril 2.5 MG Tabs  What changed:  · medication strength  · how much to take  · when to take this  Commonly known as:  VASOTEC   Take 1 Tab by mouth 2 Times a Day.  Dose:  2.5 mg        STOP taking these medications    DICLOFENAC & B6-FA-B12 COMBINATION     fenofibrate 145 MG Tabs  Commonly known as:  TRICOR            Allergies  No Known Allergies    DIET  Orders Placed This Encounter   Procedures   • Diet Order Cardiac     Standing Status:   Standing     Number of Occurrences:   1     Order Specific Question:   Diet:     Answer:   Cardiac [6]       ACTIVITY  As tolerated.  Weight bearing as tolerated    CONSULTATIONS  Cardiology    IMAGING AND PROCEDURES  Echocardiogram Comp W/O Cont   Final Result      No prior study is available for  comparison.   Normal transthoracic echocardiogram.     Cardiac catheterization-  Selective coronary angiography of the native vessels  Left heart catheterization  Left ventriculogram  Post-op diagnosis:   1.  Focal occlusion of a less than 0.5 mm vessel distal OM likely due to coronary embolism  2.  Mild nonobstructive epicardial CAD otherwise.  3.  LVEF 65%      LABORATORY  Lab Results   Component Value Date    SODIUM 139 08/02/2018    POTASSIUM 4.0 08/02/2018    CHLORIDE 108 08/02/2018    CO2 23 08/02/2018    GLUCOSE 108 (H) 08/02/2018    BUN 15 08/02/2018    CREATININE 0.67 08/02/2018        Lab Results   Component Value Date    WBC 8.0 08/02/2018    HEMOGLOBIN 12.3 08/02/2018    HEMATOCRIT 38.0 08/02/2018    PLATELETCT 199 08/02/2018        Total time of the discharge process exceeds 39 minutes.

## 2018-08-02 NOTE — CARE PLAN
Problem: Safety  Goal: Will remain free from injury  Outcome: PROGRESSING AS EXPECTED  Fall precautions in place. Treaded socks on pt. Appropriate signs on doorway. IV pole on same side as bathroom. Bedrails up. Bed in lowest position and locked.  Call light and phone within reach. Patient educated on importance of calling nurses before getting out of bed, verbalizes understanding.    Problem: Knowledge Deficit  Goal: Knowledge of disease process/condition, treatment plan, diagnostic tests, and medications will improve  Outcome: PROGRESSING AS EXPECTED  Patient and family educated about POC.  All questions answered in regards to disease process, procedures as well as medications.  Patient and family verbalized understanding and voiced no further questions at this time.

## 2018-08-02 NOTE — PROGRESS NOTES
Report received by Cande KATHLEEN. Assumed care of pt. Assessment complete, tele box on. Family at bedside. Pt A&Ox4,VSS. Pt in no apparent signs of distress at this time. POC discussed. Call light within reach, bed in lowest position, and pt has no further questions at this time.

## 2018-08-02 NOTE — CARE PLAN
Problem: Communication  Goal: The ability to communicate needs accurately and effectively will improve  Outcome: PROGRESSING SLOWER THAN EXPECTED  Pt is [place] speaking only; used  services to communitate.  Intervention: Educate patient and significant other/support system about the plan of care, procedures, treatments, medications and allow for questions   08/01/18 1811   OTHER   Pt & Family Have Been Educated on Methods Available to Report Concerns Related to Care, Treatment, Services, and Patient Safety Issues Yes         Problem: Safety  Goal: Will remain free from falls  Outcome: PROGRESSING AS EXPECTED  Pt safety precautions in place; bed in lowest lock position, 2 side rails up, non slip socks on, call light within reach- educated on when and how to use call light.  Intervention: Assess risk factors for falls   08/01/18 1811   OTHER   Fall Risk Risk to Fall - 0 - 1 point   Risk for Injury-Any positive answers results in the pt being at high risk for fall related injury Not Applicable   Mobility Status Assessment 0-Ambulates & Transfers Independently. No Assistance Required   History of fall 0   Pt Calls for Assistance Yes

## 2018-08-03 ENCOUNTER — PATIENT OUTREACH (OUTPATIENT)
Dept: HEALTH INFORMATION MANAGEMENT | Facility: OTHER | Age: 58
End: 2018-08-03

## 2018-08-03 NOTE — LETTER
Sydni Su  [address] [place] [address]    August 3, 2018      Dear Sydni Su,    Pending sale to [facility] wants to ensure your discharge home is safe and you or your loved ones have had all of your questions answered regarding your care after you leave the hospital.    Our discharge team was unsuccessful in our attempts to contact you telephonically and we wanted to be sure that you had a list of resources and contact information should you have any questions regarding your hospital stay, follow-up instructions, or active medical symptoms.    Questions or Concerns Regarding… Contact   Medical Questions Related to Your Discharge  (7 days a week, 8am-5pm) Contact a Nurse Care Coordinator   [phone]   Medical Questions Not Related to Your Discharge  (24 hours a day / 7 days a week)  Contact the Nurse Health Line   [phone]    Medications or Discharge Instructions Refer to your discharge packet   or contact your -[phone]   Follow-up Appointment(s) Schedule your appointment via [employer]   or contact Scheduling [phone]   Billing Review your statement via [employer]  or contact Billing [phone]   Medical Records Review your records via [employer]   or contact Medical Records [phone]     You can also easily access your medical information, test results and upcoming appointments via the [employer] free online health management tool. You can learn more and sign up at [email or web address]. For assistance setting up your [employer] account, please call [phone].    Once again, we want to ensure your discharge home is safe and that you have a clear understanding of any next steps in your care. If you have any questions or concerns, please do not hesitate to contact us, we are here for you. Thank you for choosing [facility] for your healthcare needs.    Sincerely,      Your [facility] Healthcare Team

## 2018-08-03 NOTE — PROGRESS NOTES
Pt aox4. Educated on discharge instructions,  Family at bedside. Verbalized understanding.  Escorted by wheelchair.